# Patient Record
Sex: FEMALE | Race: WHITE | NOT HISPANIC OR LATINO | Employment: OTHER | URBAN - METROPOLITAN AREA
[De-identification: names, ages, dates, MRNs, and addresses within clinical notes are randomized per-mention and may not be internally consistent; named-entity substitution may affect disease eponyms.]

---

## 2023-11-07 ENCOUNTER — HOSPITAL ENCOUNTER (OUTPATIENT)
Dept: CARDIOLOGY | Facility: CLINIC | Age: 63
Discharge: HOME | End: 2023-11-07
Payer: COMMERCIAL

## 2023-11-07 DIAGNOSIS — I42.0 CONGESTIVE CARDIOMYOPATHY (MULTI): ICD-10-CM

## 2023-11-07 PROCEDURE — 93296 REM INTERROG EVL PM/IDS: CPT

## 2023-11-10 PROCEDURE — 93295 DEV INTERROG REMOTE 1/2/MLT: CPT | Performed by: INTERNAL MEDICINE

## 2023-12-11 ENCOUNTER — HOSPITAL ENCOUNTER (OUTPATIENT)
Dept: CARDIOLOGY | Facility: CLINIC | Age: 63
Discharge: HOME | End: 2023-12-11
Payer: COMMERCIAL

## 2023-12-11 DIAGNOSIS — I42.0 DILATED CARDIOMYOPATHY (MULTI): ICD-10-CM

## 2023-12-11 DIAGNOSIS — Z95.810 PRESENCE OF AUTOMATIC (IMPLANTABLE) CARDIAC DEFIBRILLATOR: ICD-10-CM

## 2024-01-15 ENCOUNTER — HOSPITAL ENCOUNTER (OUTPATIENT)
Dept: CARDIOLOGY | Facility: CLINIC | Age: 64
Discharge: HOME | End: 2024-01-15
Payer: COMMERCIAL

## 2024-01-15 DIAGNOSIS — I42.0 DILATED CARDIOMYOPATHY (MULTI): ICD-10-CM

## 2024-01-15 DIAGNOSIS — Z95.810 PRESENCE OF AUTOMATIC (IMPLANTABLE) CARDIAC DEFIBRILLATOR: ICD-10-CM

## 2024-02-01 ENCOUNTER — HOSPITAL ENCOUNTER (OUTPATIENT)
Dept: CARDIOLOGY | Facility: CLINIC | Age: 64
Discharge: HOME | End: 2024-02-01
Payer: COMMERCIAL

## 2024-02-01 DIAGNOSIS — I42.0 DILATED CARDIOMYOPATHY (MULTI): Primary | ICD-10-CM

## 2024-02-01 DIAGNOSIS — Z95.810 PRESENCE OF AUTOMATIC (IMPLANTABLE) CARDIAC DEFIBRILLATOR: ICD-10-CM

## 2024-02-01 DIAGNOSIS — I42.0 DILATED CARDIOMYOPATHY (MULTI): ICD-10-CM

## 2024-02-08 ENCOUNTER — DOCUMENTATION (OUTPATIENT)
Dept: CARDIOLOGY | Facility: HOSPITAL | Age: 64
End: 2024-02-08
Payer: COMMERCIAL

## 2024-02-12 ENCOUNTER — HOSPITAL ENCOUNTER (OUTPATIENT)
Dept: CARDIOLOGY | Facility: CLINIC | Age: 64
Discharge: HOME | End: 2024-02-12
Payer: COMMERCIAL

## 2024-02-12 DIAGNOSIS — I42.0 CONGESTIVE CARDIOMYOPATHY (MULTI): ICD-10-CM

## 2024-03-08 ENCOUNTER — HOSPITAL ENCOUNTER (OUTPATIENT)
Dept: CARDIOLOGY | Facility: CLINIC | Age: 64
Discharge: HOME | End: 2024-03-08
Payer: COMMERCIAL

## 2024-03-08 DIAGNOSIS — Z95.810 PRESENCE OF AUTOMATIC (IMPLANTABLE) CARDIAC DEFIBRILLATOR: ICD-10-CM

## 2024-03-08 DIAGNOSIS — I42.0 DILATED CARDIOMYOPATHY (MULTI): ICD-10-CM

## 2024-03-13 ENCOUNTER — OFFICE VISIT (OUTPATIENT)
Dept: CARDIOLOGY | Facility: CLINIC | Age: 64
End: 2024-03-13
Payer: COMMERCIAL

## 2024-03-13 VITALS
WEIGHT: 292 LBS | BODY MASS INDEX: 51.74 KG/M2 | SYSTOLIC BLOOD PRESSURE: 120 MMHG | DIASTOLIC BLOOD PRESSURE: 60 MMHG | HEART RATE: 96 BPM | HEIGHT: 63 IN

## 2024-03-13 DIAGNOSIS — Z45.02 BIVENTRICULAR IMPLANTABLE CARDIOVERTER-DEFIBRILLATOR (ICD) AT END OF DEVICE LIFE: ICD-10-CM

## 2024-03-13 DIAGNOSIS — R79.1 ABNORMAL COAGULATION PROFILE: ICD-10-CM

## 2024-03-13 DIAGNOSIS — I42.0 DILATED CARDIOMYOPATHY (MULTI): Primary | ICD-10-CM

## 2024-03-13 DIAGNOSIS — Z95.810 PRESENCE OF AUTOMATIC (IMPLANTABLE) CARDIAC DEFIBRILLATOR: ICD-10-CM

## 2024-03-13 DIAGNOSIS — Z78.9 NEVER SMOKED TOBACCO: ICD-10-CM

## 2024-03-13 DIAGNOSIS — I42.0 CONGESTIVE CARDIOMYOPATHY (MULTI): ICD-10-CM

## 2024-03-13 PROBLEM — I50.20 SYSTOLIC HEART FAILURE, ACC/AHA STAGE C (MULTI): Status: ACTIVE | Noted: 2024-03-13

## 2024-03-13 PROBLEM — I50.43 ACUTE ON CHRONIC COMBINED SYSTOLIC AND DIASTOLIC HEART FAILURE (MULTI): Status: ACTIVE | Noted: 2024-03-13

## 2024-03-13 PROBLEM — I35.0 AORTIC STENOSIS: Status: ACTIVE | Noted: 2024-03-13

## 2024-03-13 PROBLEM — K21.9 GASTROESOPHAGEAL REFLUX DISEASE: Status: ACTIVE | Noted: 2022-08-22

## 2024-03-13 PROBLEM — I50.814: Status: ACTIVE | Noted: 2024-03-13

## 2024-03-13 PROBLEM — R57.0 CARDIOGENIC SHOCK (MULTI): Status: ACTIVE | Noted: 2018-10-03

## 2024-03-13 PROBLEM — D64.9 ANEMIA: Status: ACTIVE | Noted: 2023-09-20

## 2024-03-13 PROBLEM — E78.5 HYPERLIPIDEMIA: Status: ACTIVE | Noted: 2023-03-01

## 2024-03-13 PROBLEM — I25.9 CHRONIC MYOCARDIAL ISCHEMIA: Status: ACTIVE | Noted: 2018-05-21

## 2024-03-13 PROBLEM — J96.11 CHRONIC RESPIRATORY FAILURE WITH HYPOXIA (MULTI): Status: ACTIVE | Noted: 2022-08-22

## 2024-03-13 PROBLEM — D50.9 IRON DEFICIENCY ANEMIA: Status: ACTIVE | Noted: 2023-12-13

## 2024-03-13 PROBLEM — I13.10: Status: ACTIVE | Noted: 2024-03-13

## 2024-03-13 PROBLEM — N17.9 AKI (ACUTE KIDNEY INJURY) (CMS-HCC): Status: ACTIVE | Noted: 2024-03-13

## 2024-03-13 PROBLEM — I25.10 CAD (CORONARY ARTERY DISEASE): Status: ACTIVE | Noted: 2024-03-13

## 2024-03-13 PROBLEM — I42.8 CARDIOMYOPATHY, NONISCHEMIC (MULTI): Status: ACTIVE | Noted: 2024-03-13

## 2024-03-13 PROBLEM — I10 ESSENTIAL HYPERTENSION: Status: ACTIVE | Noted: 2019-08-31

## 2024-03-13 PROBLEM — J44.9 COPD (CHRONIC OBSTRUCTIVE PULMONARY DISEASE) (MULTI): Status: ACTIVE | Noted: 2024-03-13

## 2024-03-13 PROBLEM — E11.22 CHRONIC KIDNEY DISEASE DUE TO TYPE 2 DIABETES MELLITUS (MULTI): Status: ACTIVE | Noted: 2022-08-22

## 2024-03-13 PROBLEM — Z98.890 S/P MITRAL VALVE CLIP IMPLANTATION: Status: ACTIVE | Noted: 2024-03-13

## 2024-03-13 PROBLEM — G47.33 OBSTRUCTIVE SLEEP APNEA ON CPAP: Status: ACTIVE | Noted: 2024-03-13

## 2024-03-13 PROBLEM — F32.0 MILD MAJOR DEPRESSION, SINGLE EPISODE (CMS-HCC): Status: ACTIVE | Noted: 2024-03-13

## 2024-03-13 PROBLEM — I05.9 MITRAL VALVE DISORDER: Status: ACTIVE | Noted: 2024-03-13

## 2024-03-13 PROBLEM — E66.01 MORBID OBESITY (MULTI): Status: ACTIVE | Noted: 2023-03-02

## 2024-03-13 PROBLEM — I44.7 LBBB (LEFT BUNDLE BRANCH BLOCK): Status: ACTIVE | Noted: 2023-08-07

## 2024-03-13 PROBLEM — E11.65 DIABETES MELLITUS WITH HYPERGLYCEMIA (MULTI): Status: ACTIVE | Noted: 2024-03-13

## 2024-03-13 PROBLEM — I46.9 PEA (PULSELESS ELECTRICAL ACTIVITY) (MULTI): Status: ACTIVE | Noted: 2024-03-13

## 2024-03-13 PROBLEM — Z95.818 S/P MITRAL VALVE CLIP IMPLANTATION: Status: ACTIVE | Noted: 2024-03-13

## 2024-03-13 PROBLEM — Z95.5 PRESENCE OF STENT IN CORONARY ARTERY: Status: ACTIVE | Noted: 2018-09-09

## 2024-03-13 PROCEDURE — 99215 OFFICE O/P EST HI 40 MIN: CPT | Performed by: INTERNAL MEDICINE

## 2024-03-13 PROCEDURE — 3078F DIAST BP <80 MM HG: CPT | Performed by: INTERNAL MEDICINE

## 2024-03-13 PROCEDURE — 3008F BODY MASS INDEX DOCD: CPT | Performed by: INTERNAL MEDICINE

## 2024-03-13 PROCEDURE — 1036F TOBACCO NON-USER: CPT | Performed by: INTERNAL MEDICINE

## 2024-03-13 PROCEDURE — 3074F SYST BP LT 130 MM HG: CPT | Performed by: INTERNAL MEDICINE

## 2024-03-13 RX ORDER — ALBUTEROL SULFATE 90 UG/1
2 AEROSOL, METERED RESPIRATORY (INHALATION) EVERY 6 HOURS
COMMUNITY
Start: 2017-10-19

## 2024-03-13 RX ORDER — METOPROLOL TARTRATE 100 MG/1
100 TABLET ORAL 2 TIMES DAILY
COMMUNITY

## 2024-03-13 RX ORDER — SODIUM CHLORIDE 9 MG/ML
100 INJECTION, SOLUTION INTRAVENOUS CONTINUOUS
Status: CANCELLED | OUTPATIENT
Start: 2024-03-13

## 2024-03-13 RX ORDER — ATORVASTATIN CALCIUM 40 MG/1
40 TABLET, FILM COATED ORAL
COMMUNITY
Start: 2019-07-24

## 2024-03-13 RX ORDER — FERROUS SULFATE 325(65) MG
65 TABLET ORAL
COMMUNITY
Start: 2019-12-06

## 2024-03-13 RX ORDER — FLUTICASONE PROPIONATE AND SALMETEROL 250; 50 UG/1; UG/1
1 POWDER RESPIRATORY (INHALATION) 2 TIMES DAILY
COMMUNITY
Start: 2024-02-26

## 2024-03-13 RX ORDER — ACETAMINOPHEN 500 MG
2000 TABLET ORAL EVERY 24 HOURS
COMMUNITY
Start: 2019-04-19

## 2024-03-13 RX ORDER — ACETAMINOPHEN 325 MG/1
325 TABLET ORAL EVERY 6 HOURS PRN
COMMUNITY
Start: 2020-01-16

## 2024-03-13 RX ORDER — INSULIN GLARGINE 100 [IU]/ML
56 INJECTION, SOLUTION SUBCUTANEOUS 2 TIMES DAILY
COMMUNITY

## 2024-03-13 RX ORDER — LINAGLIPTIN 5 MG/1
5 TABLET, FILM COATED ORAL
COMMUNITY
Start: 2019-08-19

## 2024-03-13 RX ORDER — TORSEMIDE 20 MG/1
20 TABLET ORAL DAILY
COMMUNITY
Start: 2018-09-28

## 2024-03-13 RX ORDER — ESOMEPRAZOLE MAGNESIUM 40 MG/1
40 CAPSULE, DELAYED RELEASE ORAL
COMMUNITY
Start: 2022-01-07

## 2024-03-13 RX ORDER — DIGOXIN 125 MCG
125 TABLET ORAL EVERY OTHER DAY
COMMUNITY
Start: 2017-10-19

## 2024-03-13 RX ORDER — NORTRIPTYLINE HYDROCHLORIDE 75 MG/1
150 CAPSULE ORAL
COMMUNITY
Start: 2019-08-01

## 2024-03-13 RX ORDER — GABAPENTIN 600 MG/1
1 TABLET ORAL 2 TIMES DAILY
COMMUNITY
Start: 2017-04-24

## 2024-03-13 NOTE — PROGRESS NOTES
CARDIOLOGY OFFICE VISIT      CHIEF COMPLAINT  Chief Complaint   Patient presents with    New Patient Visit     Pt states she wants a second opinion with her conditions        HISTORY OF PRESENT ILLNESS  HPI    63-year-old female with a past medical history of Jehovah witness, coronary artery disease status post PCI of the LAD in April 4, 2019.  Congestive heart failure mixed ischemic and nonischemic cardiomyopathy with moderate to severe left ventricular dysfunction with a value of 30 to 35%, left under branch block status post CRT D implanted in October 8, 2018 and severe mitral regurgitation, diabetes mellitus, obstructive sleep apnea, chronic kidney disease, morbid obesity, COPD.  Patient used to follow by heart failure service back in 2019.    Looking at her record she was evaluated in January 2020 for MitraClip.  After procedure was performed postprocedure she needed to be on pressors due to episodes of hypotension and also epistaxis.    Since the discharge from the hospital she lost completely follow-up with cardiology service.  She presented today for evaluation of biventricular ICD device that recently has been producing BPM.    Apparently device has been followed remotely since then from [facility] and the device showed back in March 8, 2024, that the battery reached RRT in February 1, 2024.  There was adequate sensing, capture and impedances of all 3 leads.  Pacing mode DDD  bpm.  Patient has A Medtronic Padma MRI Quad CRT-D.  No significant atrial or ventricular arrhythmia seen on device interrogation.    Current EKG shows atrial sensed ventricular paced rhythm at a rate of 96 bpm QRS duration 192 ms QT corrected 563 ms.  Rhythm strip shows the same pattern.    Lately she has been feeling well.  Denies any symptoms like chest pain or shortness of breath.        Echocardiogram 2020    CONCLUSIONS:   1. The left ventricular systolic function is severely decreased with a 25% estimated  ejection fraction.   2. Abnormal septal motion consistent with RV pacemaker.   3. Spectral Doppler shows a pseudonormal pattern of left ventricular diastolic filling.   4. There is eccentric left ventricular hypertrophy.   5. The left atrium is moderately dilated.   6. A mitral clip is present resulting in dual mitral orifices. There is moderate mitral stenosis by gradient (10 mmHg) mild by pressure half-time (2.1 cm2).   7. The left ventricular cavity size is moderate to severely dilated.   8. There is global hypokinesis of the left ventricle with minor regional variations.    Catheterization in 2018 with successful drug-eluting stent implantation with the mid LAD.    Past Medical History  No past medical history on file.    Social History  Social History     Tobacco Use    Smoking status: Never    Smokeless tobacco: Never   Substance Use Topics    Alcohol use: Never    Drug use: Never       Family History   No family history on file.     Allergies:  Allergies   Allergen Reactions    Pineapple Anaphylaxis and Hives    Hydrocodone-Acetaminophen GI Upset    Mushroom Hives    Oxycodone-Acetaminophen GI Upset    Penicillins Hives    Tramadol GI Upset        Outpatient Medications:  Current Outpatient Medications   Medication Instructions    acetaminophen (TYLENOL) 325 mg, oral, Every 6 hours PRN    albuterol (ProAir HFA) 90 mcg/actuation inhaler 2 puffs, inhalation, Every 6 hours    aspirin 81 mg capsule oral    atorvastatin (LIPITOR) 40 mg, oral    cholecalciferol (VITAMIN D-3) 2,000 Units, oral, Every 24 hours    digoxin (LANOXIN) 125 mg, oral, Every 24 hours, Every other day     esomeprazole (NEXIUM) 40 mg, oral, Daily before breakfast    ferrous sulfate (325 mg ferrous sulfate) 65 mg, oral    fluticasone propion-salmeteroL (Advair Diskus) 250-50 mcg/dose diskus inhaler 1 puff, inhalation, 2 times daily    gabapentin (Neurontin) 600 mg tablet 1 tablet, oral, 2 times daily    INSULIN ASPART U-100 SUBQ subcutaneous     Lantus Solostar U-100 Insulin 100 unit/mL (3 mL) pen INJECT 50 UNITS SUBCUTANEOUSLY TWICE DAILY    metoprolol tartrate (LOPRESSOR) 100 mg, oral, 2 times daily    nortriptyline (PAMELOR) 150 mg, oral    torsemide (Demadex) 20 mg tablet 1 tablet, oral, 2 times daily    Tradjenta 5 mg, oral          REVIEW OF SYSTEMS  Review of Systems   Constitutional:        Walking cane for assistance    All other systems reviewed and are negative.        VITALS  Vitals:    03/13/24 1712   BP: 120/60   Pulse: 96       PHYSICAL EXAM  Vitals reviewed.   Constitutional:       Appearance: Normal and healthy appearance. Well-developed and not in distress.   Eyes:      Conjunctiva/sclera: Conjunctivae normal.      Pupils: Pupils are equal, round, and reactive to light.   Neck:      Vascular: No JVR. JVD normal.   Pulmonary:      Effort: Pulmonary effort is normal.      Breath sounds: Normal breath sounds. No wheezing. No rhonchi. No rales.   Chest:      Chest wall: Not tender to palpatation.      Comments: Left sided device pocket- healed and well approximated. No lump or hematoma    Cardiovascular:      PMI at left midclavicular line. Normal rate. Regular rhythm. Normal S1. Normal S2.       Murmurs: There is no murmur.      No gallop.  No click. No rub.   Pulses:     Intact distal pulses.   Edema:     Peripheral edema absent.   Abdominal:      Tenderness: There is no abdominal tenderness.   Musculoskeletal: Normal range of motion.         General: No tenderness.      Cervical back: Normal range of motion. Skin:     General: Skin is warm and dry.   Neurological:      General: No focal deficit present.      Mental Status: Alert and oriented to person, place and time.   Psychiatric:         Behavior: Behavior is cooperative.           ASSESSMENT AND PLAN  Clinical impression    1.  Congestive heart failure new Heart Association class II  2.  Ischemic-nonischemic cardiomyopathy with a left ventricular action fraction of 30 to 35%.  Last  echocardiogram in 2020 shows left ventricular ejection fraction of 20%.  3.  Status post biventricular ICD system implanted back in 2019 now with battery at RRT.  4.  Coronary artery disease with history of percutaneous coronary interventions in the past  5.  Severe mitral regurgitation status post mitral clip in 2020  6.  Diabetes mellitus  7.  Stress sleep apnea  8.  Chronic kidney disease  9.  COPD    Plan recommendations    I had a lengthy discussion with patient and family member regarding device interrogations.  So far she has not had a personal device interrogation but [facility] has been receiving device interrogation and it shows that battery is at RRT since February 1, 2024.  She is not pacemaker dependent.  Patient will be scheduled for biventricular ICD by exchange in the next few days [facility].  Procedure, risk, benefits and possible complications were explained to patient.  All questions were answered.  Patient agrees with plan.  Informed consent was signed.    Also going to her heart failure medications, there is some difference between medications that she used to receive in the past.  We will set up an appointment with Dr. Franco from heart failure service for adjustment of medical therapy.    Follow my office in 7 days postprocedure for wound assessment and follow with device clinic as scheduled.    Risk factor modification and lifestyle modification discussed with patient. Diet , exercise and hydration discussed with patient.    I have personally review with patient during this office visit, laboratory data, echocardiogram results, stress test results, Holter-event monitor results prior and after the last electrophysiology visit. All questions has been answered.    Please excuse any errors in grammar or translation related to this dictation.  Voice recognition software was utilized to prepare this document.

## 2024-03-13 NOTE — PATIENT INSTRUCTIONS
Patient to follow up in 3 months with DONNA Garay     Office will schedule ICD Generator change for you soon with Dr. Robb Schuster MD    Please complete lab work prior to procedure- orders in system.   You can take all of your normal morning medications the day of the procedure.     Office will also schedule an Echo in near future.     Please schedule follow ups with Dr. Franco and Dr. Coleman soon    No other changes today.   Continue same medications and treatments.   Patient educated on proper medication use.   Patient educated on risk factor modification.   Please bring any lab results from other providers / physicians to your next appointment.     Please bring all medicines, vitamins, and herbal supplements with you when you come to the office.     Prescriptions will not be filled unless you are compliant with your follow up appointments or have a follow up appointment scheduled as per instruction of your physician. Refills should be requested at the time of your visit.    Juan Miguel SANTA RN am scribing for and in the presence of Dr. Robb Schuster MD

## 2024-03-13 NOTE — H&P (VIEW-ONLY)
CARDIOLOGY OFFICE VISIT      CHIEF COMPLAINT  Chief Complaint   Patient presents with    New Patient Visit     Pt states she wants a second opinion with her conditions        HISTORY OF PRESENT ILLNESS  HPI    63-year-old female with a past medical history of Jehovah witness, coronary artery disease status post PCI of the LAD in April 4, 2019.  Congestive heart failure mixed ischemic and nonischemic cardiomyopathy with moderate to severe left ventricular dysfunction with a value of 30 to 35%, left under branch block status post CRT D implanted in October 8, 2018 and severe mitral regurgitation, diabetes mellitus, obstructive sleep apnea, chronic kidney disease, morbid obesity, COPD.  Patient used to follow by heart failure service back in 2019.    Looking at her record she was evaluated in January 2020 for MitraClip.  After procedure was performed postprocedure she needed to be on pressors due to episodes of hypotension and also epistaxis.    Since the discharge from the hospital she lost completely follow-up with cardiology service.  She presented today for evaluation of biventricular ICD device that recently has been producing BPM.    Apparently device has been followed remotely since then from [facility] and the device showed back in March 8, 2024, that the battery reached RRT in February 1, 2024.  There was adequate sensing, capture and impedances of all 3 leads.  Pacing mode DDD  bpm.  Patient has A Medtronic Padma MRI Quad CRT-D.  No significant atrial or ventricular arrhythmia seen on device interrogation.    Current EKG shows atrial sensed ventricular paced rhythm at a rate of 96 bpm QRS duration 192 ms QT corrected 563 ms.  Rhythm strip shows the same pattern.    Lately she has been feeling well.  Denies any symptoms like chest pain or shortness of breath.        Echocardiogram 2020    CONCLUSIONS:   1. The left ventricular systolic function is severely decreased with a 25% estimated  ejection fraction.   2. Abnormal septal motion consistent with RV pacemaker.   3. Spectral Doppler shows a pseudonormal pattern of left ventricular diastolic filling.   4. There is eccentric left ventricular hypertrophy.   5. The left atrium is moderately dilated.   6. A mitral clip is present resulting in dual mitral orifices. There is moderate mitral stenosis by gradient (10 mmHg) mild by pressure half-time (2.1 cm2).   7. The left ventricular cavity size is moderate to severely dilated.   8. There is global hypokinesis of the left ventricle with minor regional variations.    Catheterization in 2018 with successful drug-eluting stent implantation with the mid LAD.    Past Medical History  No past medical history on file.    Social History  Social History     Tobacco Use    Smoking status: Never    Smokeless tobacco: Never   Substance Use Topics    Alcohol use: Never    Drug use: Never       Family History   No family history on file.     Allergies:  Allergies   Allergen Reactions    Pineapple Anaphylaxis and Hives    Hydrocodone-Acetaminophen GI Upset    Mushroom Hives    Oxycodone-Acetaminophen GI Upset    Penicillins Hives    Tramadol GI Upset        Outpatient Medications:  Current Outpatient Medications   Medication Instructions    acetaminophen (TYLENOL) 325 mg, oral, Every 6 hours PRN    albuterol (ProAir HFA) 90 mcg/actuation inhaler 2 puffs, inhalation, Every 6 hours    aspirin 81 mg capsule oral    atorvastatin (LIPITOR) 40 mg, oral    cholecalciferol (VITAMIN D-3) 2,000 Units, oral, Every 24 hours    digoxin (LANOXIN) 125 mg, oral, Every 24 hours, Every other day     esomeprazole (NEXIUM) 40 mg, oral, Daily before breakfast    ferrous sulfate (325 mg ferrous sulfate) 65 mg, oral    fluticasone propion-salmeteroL (Advair Diskus) 250-50 mcg/dose diskus inhaler 1 puff, inhalation, 2 times daily    gabapentin (Neurontin) 600 mg tablet 1 tablet, oral, 2 times daily    INSULIN ASPART U-100 SUBQ subcutaneous     Lantus Solostar U-100 Insulin 100 unit/mL (3 mL) pen INJECT 50 UNITS SUBCUTANEOUSLY TWICE DAILY    metoprolol tartrate (LOPRESSOR) 100 mg, oral, 2 times daily    nortriptyline (PAMELOR) 150 mg, oral    torsemide (Demadex) 20 mg tablet 1 tablet, oral, 2 times daily    Tradjenta 5 mg, oral          REVIEW OF SYSTEMS  Review of Systems   Constitutional:        Walking cane for assistance    All other systems reviewed and are negative.        VITALS  Vitals:    03/13/24 1712   BP: 120/60   Pulse: 96       PHYSICAL EXAM  Vitals reviewed.   Constitutional:       Appearance: Normal and healthy appearance. Well-developed and not in distress.   Eyes:      Conjunctiva/sclera: Conjunctivae normal.      Pupils: Pupils are equal, round, and reactive to light.   Neck:      Vascular: No JVR. JVD normal.   Pulmonary:      Effort: Pulmonary effort is normal.      Breath sounds: Normal breath sounds. No wheezing. No rhonchi. No rales.   Chest:      Chest wall: Not tender to palpatation.      Comments: Left sided device pocket- healed and well approximated. No lump or hematoma    Cardiovascular:      PMI at left midclavicular line. Normal rate. Regular rhythm. Normal S1. Normal S2.       Murmurs: There is no murmur.      No gallop.  No click. No rub.   Pulses:     Intact distal pulses.   Edema:     Peripheral edema absent.   Abdominal:      Tenderness: There is no abdominal tenderness.   Musculoskeletal: Normal range of motion.         General: No tenderness.      Cervical back: Normal range of motion. Skin:     General: Skin is warm and dry.   Neurological:      General: No focal deficit present.      Mental Status: Alert and oriented to person, place and time.   Psychiatric:         Behavior: Behavior is cooperative.           ASSESSMENT AND PLAN  Clinical impression    1.  Congestive heart failure new Heart Association class II  2.  Ischemic-nonischemic cardiomyopathy with a left ventricular action fraction of 30 to 35%.  Last  echocardiogram in 2020 shows left ventricular ejection fraction of 20%.  3.  Status post biventricular ICD system implanted back in 2019 now with battery at RRT.  4.  Coronary artery disease with history of percutaneous coronary interventions in the past  5.  Severe mitral regurgitation status post mitral clip in 2020  6.  Diabetes mellitus  7.  Stress sleep apnea  8.  Chronic kidney disease  9.  COPD    Plan recommendations    I had a lengthy discussion with patient and family member regarding device interrogations.  So far she has not had a personal device interrogation but [facility] has been receiving device interrogation and it shows that battery is at RRT since February 1, 2024.  She is not pacemaker dependent.  Patient will be scheduled for biventricular ICD by exchange in the next few days [facility].  Procedure, risk, benefits and possible complications were explained to patient.  All questions were answered.  Patient agrees with plan.  Informed consent was signed.    Also going to her heart failure medications, there is some difference between medications that she used to receive in the past.  We will set up an appointment with Dr. Franco from heart failure service for adjustment of medical therapy.    Follow my office in 7 days postprocedure for wound assessment and follow with device clinic as scheduled.    Risk factor modification and lifestyle modification discussed with patient. Diet , exercise and hydration discussed with patient.    I have personally review with patient during this office visit, laboratory data, echocardiogram results, stress test results, Holter-event monitor results prior and after the last electrophysiology visit. All questions has been answered.    Please excuse any errors in grammar or translation related to this dictation.  Voice recognition software was utilized to prepare this document.

## 2024-03-14 ENCOUNTER — TELEPHONE (OUTPATIENT)
Dept: CARDIOLOGY | Facility: CLINIC | Age: 64
End: 2024-03-14
Payer: COMMERCIAL

## 2024-03-14 DIAGNOSIS — I42.8 CARDIOMYOPATHY, NONISCHEMIC (MULTI): ICD-10-CM

## 2024-03-14 DIAGNOSIS — I50.43 ACUTE ON CHRONIC COMBINED SYSTOLIC AND DIASTOLIC HEART FAILURE (MULTI): Primary | ICD-10-CM

## 2024-03-14 PROBLEM — Z95.810 PRESENCE OF AUTOMATIC (IMPLANTABLE) CARDIAC DEFIBRILLATOR: Status: ACTIVE | Noted: 2024-03-13

## 2024-03-20 LAB
NON-UH HIE ANION GAP:SCNC:PT:SER/PLAS:QN:: 13 MEQ/L (ref 6–16)
NON-UH HIE CALCIUM:MCNC:PT:SER/PLAS:QN:: 8.7 MG/DL (ref 8.9–11.1)
NON-UH HIE CARBON DIOXIDE:SCNC:PT:SER/PLAS:QN:: 31 MMOL/L (ref 21–31)
NON-UH HIE CHLORIDE:SCNC:PT:SER/PLAS:QN:: 102 MMOL/L (ref 101–111)
NON-UH HIE COAGULATION SURFACE INDUCED:TIME:PT:PPP:QN:COAG: 41.9 SECOND(S) (ref 25.1–36.5)
NON-UH HIE COAGULATION TISSUE FACTOR INDUCED.INR:RELTIME:PT:PPP:QN:COAG: 1.04
NON-UH HIE COAGULATION TISSUE FACTOR INDUCED:TIME:PT:PPP:QN:COAG: 11.7 SECOND(S) (ref 9.4–12.5)
NON-UH HIE CREATININE:MCNC:PT:SER/PLAS:QN:: 1.9 MG/DL (ref 0.5–1.3)
NON-UH HIE EGFR: 30 ML/MIN/1.73 M2
NON-UH HIE ERYTHROCYTE DISTRIBUTION WIDTH:RATIO:PT:RBC:QN:AUTOMATED COUNT: 18.3 % (ref 10.9–14.2)
NON-UH HIE ERYTHROCYTE MEAN CORPUSCULAR HEMOGLOBIN CONCENTRATION:MCNC:PT:RB: 32.7 GM/DL (ref 31.4–36)
NON-UH HIE ERYTHROCYTE MEAN CORPUSCULAR HEMOGLOBIN:ENTMASS:PT:RBC:QN:AUTOMA: 28.8 PG (ref 27–34)
NON-UH HIE ERYTHROCYTE MEAN CORPUSCULAR VOLUME:ENTVOL:PT:RBC:QN:AUTOMATED C: 88.1 FL (ref 80–100)
NON-UH HIE ERYTHROCYTE SIZE:MORPH:PT:BLD:NOM:: NORMAL
NON-UH HIE ERYTHROCYTES:NCNC:PT:BLD:QN:AUTOMATED COUNT: 3.9 E12/L (ref 4.3–5.9)
NON-UH HIE GLUCOSE:MCNC:PT:SER/PLAS:QN:: 171 MG/DL (ref 55–199)
NON-UH HIE HEMATOCRIT:VFR:PT:BLD:QN:AUTOMATED COUNT: 34.6 % (ref 34–46)
NON-UH HIE HEMOGLOBIN:MCNC:PT:BLD:QN:: 11.3 GM/DL (ref 12–16)
NON-UH HIE LEUKOCYTES: 8.5 E9/L (ref 4–11)
NON-UH HIE PLATELET MEAN VOLUME:ENTVOL:PT:BLD:QN:AUTOMATED COUNT: 7.7 FL (ref 6.4–10.8)
NON-UH HIE PLATELET: 240 E9/L (ref 150–500)
NON-UH HIE POTASSIUM:SCNC:PT:SER/PLAS:QN:: 3.7 MMOL/L (ref 3.5–5.3)
NON-UH HIE SODIUM:SCNC:PT:SER/PLAS:QN:: 142 MMOL/L (ref 135–145)
NON-UH HIE UREA NITROGEN/CREATININE:MRTO:PT:SER/PLAS:QN:: 22 NO UNITS (ref 10–20)
NON-UH HIE UREA NITROGEN:MCNC:PT:SER/PLAS:QN:: 42 MG/DL (ref 5–21)

## 2024-03-28 ENCOUNTER — HOSPITAL ENCOUNTER (OUTPATIENT)
Facility: HOSPITAL | Age: 64
Setting detail: OUTPATIENT SURGERY
Discharge: HOME | End: 2024-03-28
Attending: INTERNAL MEDICINE | Admitting: INTERNAL MEDICINE
Payer: COMMERCIAL

## 2024-03-28 ENCOUNTER — ANESTHESIA (OUTPATIENT)
Dept: CARDIOLOGY | Facility: HOSPITAL | Age: 64
End: 2024-03-28

## 2024-03-28 ENCOUNTER — APPOINTMENT (OUTPATIENT)
Dept: CARDIOLOGY | Facility: HOSPITAL | Age: 64
End: 2024-03-28
Payer: COMMERCIAL

## 2024-03-28 ENCOUNTER — ANESTHESIA EVENT (OUTPATIENT)
Dept: CARDIOLOGY | Facility: HOSPITAL | Age: 64
End: 2024-03-28

## 2024-03-28 VITALS
RESPIRATION RATE: 20 BRPM | BODY MASS INDEX: 51.91 KG/M2 | TEMPERATURE: 96.8 F | DIASTOLIC BLOOD PRESSURE: 66 MMHG | SYSTOLIC BLOOD PRESSURE: 143 MMHG | HEART RATE: 82 BPM | HEIGHT: 63 IN | OXYGEN SATURATION: 98 % | WEIGHT: 293 LBS

## 2024-03-28 DIAGNOSIS — Z95.810 PRESENCE OF AUTOMATIC (IMPLANTABLE) CARDIAC DEFIBRILLATOR: Primary | ICD-10-CM

## 2024-03-28 LAB
ANION GAP SERPL CALC-SCNC: 12 MMOL/L (ref 10–20)
APTT PPP: 39 SECONDS (ref 27–38)
BUN SERPL-MCNC: 42 MG/DL (ref 6–23)
CALCIUM SERPL-MCNC: 8.7 MG/DL (ref 8.6–10.3)
CHLORIDE SERPL-SCNC: 105 MMOL/L (ref 98–107)
CO2 SERPL-SCNC: 26 MMOL/L (ref 21–32)
CREAT SERPL-MCNC: 1.81 MG/DL (ref 0.5–1.05)
EGFRCR SERPLBLD CKD-EPI 2021: 32 ML/MIN/1.73M*2
ERYTHROCYTE [DISTWIDTH] IN BLOOD BY AUTOMATED COUNT: 17.9 % (ref 11.5–14.5)
GLUCOSE BLD MANUAL STRIP-MCNC: 105 MG/DL (ref 74–99)
GLUCOSE BLD MANUAL STRIP-MCNC: 48 MG/DL (ref 74–99)
GLUCOSE BLD MANUAL STRIP-MCNC: 59 MG/DL (ref 74–99)
GLUCOSE BLD MANUAL STRIP-MCNC: 71 MG/DL (ref 74–99)
GLUCOSE BLD MANUAL STRIP-MCNC: 82 MG/DL (ref 74–99)
GLUCOSE SERPL-MCNC: 69 MG/DL (ref 74–99)
HCT VFR BLD AUTO: 36.6 % (ref 36–46)
HGB BLD-MCNC: 11.8 G/DL (ref 12–16)
INR PPP: 1.2 (ref 0.9–1.1)
MCH RBC QN AUTO: 29.1 PG (ref 26–34)
MCHC RBC AUTO-ENTMCNC: 32.2 G/DL (ref 32–36)
MCV RBC AUTO: 90 FL (ref 80–100)
NRBC BLD-RTO: 0 /100 WBCS (ref 0–0)
PLATELET # BLD AUTO: 212 X10*3/UL (ref 150–450)
POTASSIUM SERPL-SCNC: 3.4 MMOL/L (ref 3.5–5.3)
PROTHROMBIN TIME: 14 SECONDS (ref 9.8–12.8)
RBC # BLD AUTO: 4.06 X10*6/UL (ref 4–5.2)
SODIUM SERPL-SCNC: 140 MMOL/L (ref 136–145)
WBC # BLD AUTO: 7.3 X10*3/UL (ref 4.4–11.3)

## 2024-03-28 PROCEDURE — 85027 COMPLETE CBC AUTOMATED: CPT | Performed by: NURSE PRACTITIONER

## 2024-03-28 PROCEDURE — 2500000005 HC RX 250 GENERAL PHARMACY W/O HCPCS: Performed by: NURSE PRACTITIONER

## 2024-03-28 PROCEDURE — 99153 MOD SED SAME PHYS/QHP EA: CPT | Performed by: INTERNAL MEDICINE

## 2024-03-28 PROCEDURE — 93287 PERI-PX DEVICE EVAL & PRGR: CPT | Performed by: INTERNAL MEDICINE

## 2024-03-28 PROCEDURE — 7100000009 HC PHASE TWO TIME - INITIAL BASE CHARGE: Performed by: INTERNAL MEDICINE

## 2024-03-28 PROCEDURE — 7100000010 HC PHASE TWO TIME - EACH INCREMENTAL 1 MINUTE: Performed by: INTERNAL MEDICINE

## 2024-03-28 PROCEDURE — 33264 RMVL & RPLCMT DFB GEN MLT LD: CPT | Performed by: INTERNAL MEDICINE

## 2024-03-28 PROCEDURE — 82947 ASSAY GLUCOSE BLOOD QUANT: CPT | Mod: 59

## 2024-03-28 PROCEDURE — 99152 MOD SED SAME PHYS/QHP 5/>YRS: CPT | Performed by: INTERNAL MEDICINE

## 2024-03-28 PROCEDURE — 2500000004 HC RX 250 GENERAL PHARMACY W/ HCPCS (ALT 636 FOR OP/ED): Performed by: NURSE PRACTITIONER

## 2024-03-28 PROCEDURE — 2500000001 HC RX 250 WO HCPCS SELF ADMINISTERED DRUGS (ALT 637 FOR MEDICARE OP): Performed by: NURSE PRACTITIONER

## 2024-03-28 PROCEDURE — 2750000001 HC OR 275 NO HCPCS: Performed by: INTERNAL MEDICINE

## 2024-03-28 PROCEDURE — 2500000004 HC RX 250 GENERAL PHARMACY W/ HCPCS (ALT 636 FOR OP/ED): Performed by: INTERNAL MEDICINE

## 2024-03-28 PROCEDURE — 36415 COLL VENOUS BLD VENIPUNCTURE: CPT | Performed by: NURSE PRACTITIONER

## 2024-03-28 PROCEDURE — 2720000007 HC OR 272 NO HCPCS: Performed by: INTERNAL MEDICINE

## 2024-03-28 PROCEDURE — 85610 PROTHROMBIN TIME: CPT | Performed by: NURSE PRACTITIONER

## 2024-03-28 PROCEDURE — 2500000005 HC RX 250 GENERAL PHARMACY W/O HCPCS: Performed by: INTERNAL MEDICINE

## 2024-03-28 PROCEDURE — C1882 AICD, OTHER THAN SING/DUAL: HCPCS | Performed by: INTERNAL MEDICINE

## 2024-03-28 PROCEDURE — 93010 ELECTROCARDIOGRAM REPORT: CPT | Performed by: INTERNAL MEDICINE

## 2024-03-28 PROCEDURE — 93641 EP EVL 1/2CHMB PAC CVDFB TST: CPT | Performed by: INTERNAL MEDICINE

## 2024-03-28 PROCEDURE — 93005 ELECTROCARDIOGRAM TRACING: CPT

## 2024-03-28 PROCEDURE — 80048 BASIC METABOLIC PNL TOTAL CA: CPT | Performed by: NURSE PRACTITIONER

## 2024-03-28 PROCEDURE — 2500000002 HC RX 250 W HCPCS SELF ADMINISTERED DRUGS (ALT 637 FOR MEDICARE OP, ALT 636 FOR OP/ED): Performed by: NURSE PRACTITIONER

## 2024-03-28 DEVICE — CRTD DTPA2QQ COBALT XT HF QUAD MRI DF4
Type: IMPLANTABLE DEVICE | Site: CHEST | Status: FUNCTIONAL
Brand: COBALT™ XT HF QUAD CRT-D MRI SURESCAN™

## 2024-03-28 RX ORDER — SODIUM CHLORIDE 9 MG/ML
20 INJECTION, SOLUTION INTRAVENOUS CONTINUOUS
Status: DISCONTINUED | OUTPATIENT
Start: 2024-03-28 | End: 2024-03-28

## 2024-03-28 RX ORDER — POTASSIUM CHLORIDE 20 MEQ/1
40 TABLET, EXTENDED RELEASE ORAL ONCE
Status: COMPLETED | OUTPATIENT
Start: 2024-03-28 | End: 2024-03-28

## 2024-03-28 RX ORDER — LIDOCAINE HYDROCHLORIDE 10 MG/ML
INJECTION, SOLUTION EPIDURAL; INFILTRATION; INTRACAUDAL; PERINEURAL AS NEEDED
Status: DISCONTINUED | OUTPATIENT
Start: 2024-03-28 | End: 2024-03-28 | Stop reason: HOSPADM

## 2024-03-28 RX ORDER — FENTANYL CITRATE 50 UG/ML
INJECTION, SOLUTION INTRAMUSCULAR; INTRAVENOUS AS NEEDED
Status: DISCONTINUED | OUTPATIENT
Start: 2024-03-28 | End: 2024-03-28 | Stop reason: HOSPADM

## 2024-03-28 RX ORDER — ONDANSETRON HYDROCHLORIDE 2 MG/ML
4 INJECTION, SOLUTION INTRAVENOUS EVERY 8 HOURS PRN
Status: DISCONTINUED | OUTPATIENT
Start: 2024-03-28 | End: 2024-03-28 | Stop reason: HOSPADM

## 2024-03-28 RX ORDER — ACETAMINOPHEN 325 MG/1
650 TABLET ORAL EVERY 4 HOURS PRN
Status: DISCONTINUED | OUTPATIENT
Start: 2024-03-28 | End: 2024-03-28 | Stop reason: HOSPADM

## 2024-03-28 RX ORDER — VANCOMYCIN HYDROCHLORIDE 1 G/20ML
INJECTION, POWDER, LYOPHILIZED, FOR SOLUTION INTRAVENOUS AS NEEDED
Status: DISCONTINUED | OUTPATIENT
Start: 2024-03-28 | End: 2024-03-28 | Stop reason: HOSPADM

## 2024-03-28 RX ORDER — CHLORHEXIDINE GLUCONATE 40 MG/ML
SOLUTION TOPICAL ONCE
Status: COMPLETED | OUTPATIENT
Start: 2024-03-28 | End: 2024-03-28

## 2024-03-28 RX ORDER — DEXTROSE 50 % IN WATER (D50W) INTRAVENOUS SYRINGE
12.5 ONCE AS NEEDED
Status: COMPLETED | OUTPATIENT
Start: 2024-03-28 | End: 2024-03-28

## 2024-03-28 RX ORDER — MUPIROCIN 20 MG/G
1 OINTMENT TOPICAL ONCE
Status: COMPLETED | OUTPATIENT
Start: 2024-03-28 | End: 2024-03-28

## 2024-03-28 RX ORDER — VANCOMYCIN HYDROCHLORIDE 1 G/200ML
1000 INJECTION, SOLUTION INTRAVENOUS ONCE
Status: COMPLETED | OUTPATIENT
Start: 2024-03-28 | End: 2024-03-28

## 2024-03-28 RX ORDER — MIDAZOLAM HYDROCHLORIDE 1 MG/ML
INJECTION, SOLUTION INTRAMUSCULAR; INTRAVENOUS AS NEEDED
Status: DISCONTINUED | OUTPATIENT
Start: 2024-03-28 | End: 2024-03-28 | Stop reason: HOSPADM

## 2024-03-28 RX ORDER — DEXTROSE MONOHYDRATE AND SODIUM CHLORIDE 5; .45 G/100ML; G/100ML
50 INJECTION, SOLUTION INTRAVENOUS CONTINUOUS
Status: DISCONTINUED | OUTPATIENT
Start: 2024-03-28 | End: 2024-03-28 | Stop reason: HOSPADM

## 2024-03-28 RX ADMIN — DEXTROSE AND SODIUM CHLORIDE 50 ML/HR: 5; 450 INJECTION, SOLUTION INTRAVENOUS at 11:49

## 2024-03-28 RX ADMIN — Medication: at 11:22

## 2024-03-28 RX ADMIN — MUPIROCIN 1 APPLICATION: 20 OINTMENT TOPICAL at 11:22

## 2024-03-28 RX ADMIN — SODIUM CHLORIDE 20 ML/HR: 9 INJECTION, SOLUTION INTRAVENOUS at 11:22

## 2024-03-28 RX ADMIN — DEXTROSE MONOHYDRATE 12.5 G: 25 INJECTION, SOLUTION INTRAVENOUS at 12:33

## 2024-03-28 RX ADMIN — POTASSIUM CHLORIDE 40 MEQ: 1500 TABLET, EXTENDED RELEASE ORAL at 15:49

## 2024-03-28 SDOH — HEALTH STABILITY: MENTAL HEALTH: CURRENT SMOKER: 0

## 2024-03-28 ASSESSMENT — PAIN SCALES - GENERAL
PAINLEVEL_OUTOF10: 0 - NO PAIN

## 2024-03-28 ASSESSMENT — COLUMBIA-SUICIDE SEVERITY RATING SCALE - C-SSRS
6. HAVE YOU EVER DONE ANYTHING, STARTED TO DO ANYTHING, OR PREPARED TO DO ANYTHING TO END YOUR LIFE?: NO
1. IN THE PAST MONTH, HAVE YOU WISHED YOU WERE DEAD OR WISHED YOU COULD GO TO SLEEP AND NOT WAKE UP?: NO
2. HAVE YOU ACTUALLY HAD ANY THOUGHTS OF KILLING YOURSELF?: NO

## 2024-03-28 ASSESSMENT — PAIN - FUNCTIONAL ASSESSMENT
PAIN_FUNCTIONAL_ASSESSMENT: 0-10

## 2024-03-28 NOTE — NURSING NOTE
Patient returned from EP lab s/p ICD gen change. Right upper chest mepilex CDI, ice pack applied. Patient has no c/o at this time. Patient taking nourishment with daughter at bedside. Will continue to monitor.

## 2024-03-28 NOTE — NURSING NOTE
Discharge instructions reviewed with patient and daughter. Discussed restrictions post procedure and device monitoring. Questions and concerns addressed. Plan to dc home.

## 2024-03-28 NOTE — DISCHARGE INSTRUCTIONS
Home going instructions after a Pacemaker/ Defibrillator generator change    After a procedure using sedation  You should return home and rest for the remainder of the day and evening.   It is recommended a responsible adult be with you for the first 24 hours after the procedure.  Do not make any legal decisions for 24 hours after your procedure.  Do not drink alcoholic beverages for 24 hours after your procedure.    Wound care  Leave the surgical dressing in place for 7 days post implant  The dressing will be removed at the 1 week follow up appointment.    The dressing is water resistant.    You may shower and avoid water directly hitting the dressing.     Inspect your incisional site/ dressing each day  It is normal for the area around the incision to be tender for a few weeks following surgery.    Apply ice to the site 3-4 times per day in 20 minute intervals for at least 2 days after surgery.    Pain relievers such as Tylenol or Motrin are usually sufficient for pain relief.      Activity  Avoid driving for 24 hours  If you have had passing out spells or previously restricted from driving, discuss driving restrictions with your doctor.    Report to your physician    Increased redness, swelling, drainage or gaping of your incisional site  Increased pain at site unrelieved by pain medication  Fever or chills prior to the 1 week appointment  Bright red bleeding from the incisional site or complete saturation of the dressing  Dizziness, lightheadedness, or passing out    Remote monitoring/ Device ID card  You have been instructed by the device company representative regarding remote home monitoring.   There are multiple types of home monitoring units, please follow the instructions given  If you have questions please contact the device clinic for further instruction  After implant you will receive a temporary card.    Permanent card will come in the mail in the next few weeks.  It is important that you carry  your ID card with you at all times.      Follow up appointments  Incision (wound) check in 1 week  Appointment for device check and provider follow up in 6 months  These appointments will be scheduled and appear on your after visit summary

## 2024-03-28 NOTE — ANESTHESIA PREPROCEDURE EVALUATION
Patient: Ankita Wynne    Procedure Information       Date/Time: 03/28/24 1230    Procedure: ICD - Dual Generator Change Out (Left: Chest)    Location: [facility]    Providers: Robb Schuster MD            Relevant Problems   Cardiac   (+) Aortic stenosis   (+) CAD (coronary artery disease)   (+) Essential hypertension   (+) Hyperlipidemia   (+) LBBB (left bundle branch block)   (+) Mitral valve disorder   (+) PEA (Pulseless electrical activity) (CMS/[facility])   (+) Presence of automatic (implantable) cardiac defibrillator   (+) Presence of stent in coronary artery   (+) Right-sided congestive heart failure secondary to left-sided congestive heart failure (CMS/HCC)      Pulmonary   (+) COPD (chronic obstructive pulmonary disease) (CMS/[facility])   (+) Obstructive sleep apnea on CPAP      Neuro   (+) Mild major depression, single episode (CMS/HCC)      GI   (+) Gastroesophageal reflux disease      Endocrine   (+) 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 : N  Date of Last Liquid: 03/28/24  Time of Last Liquid: 0830  Date of Last Solid: 03/27/24  Time of Last Solid: 1930  Last Intake Type: Clear fluids  Time of Last Void: 0700         Physical Exam    Airway  Mallampati: II  TM distance: >3 FB  Neck ROM: full     Cardiovascular    Dental - normal exam     Pulmonary    Abdominal        Anesthesia Plan    History of general anesthesia?: yes  History of complications of general anesthesia?: no    ASA 3     MAC     The patient is not a current smoker.    Anesthetic plan and risks discussed with patient.    Plan discussed with CRNA.

## 2024-03-29 LAB
ATRIAL RATE: 81 BPM
P AXIS: 66 DEGREES
P OFFSET: 186 MS
P ONSET: 122 MS
PR INTERVAL: 132 MS
Q ONSET: 188 MS
QRS COUNT: 13 BEATS
QRS DURATION: 186 MS
QT INTERVAL: 458 MS
QTC CALCULATION(BAZETT): 532 MS
QTC FREDERICIA: 506 MS
R AXIS: 135 DEGREES
T AXIS: -26 DEGREES
T OFFSET: 417 MS
VENTRICULAR RATE: 81 BPM

## 2024-03-29 NOTE — SIGNIFICANT EVENT
Patient states that incision looks well and she is feeling good.  She has a followup appt scheduled and would like to recognize the surgical team  in the EP lab for their ultimate care.

## 2024-04-04 ENCOUNTER — CLINICAL SUPPORT (OUTPATIENT)
Dept: CARDIOLOGY | Facility: CLINIC | Age: 64
End: 2024-04-04
Payer: COMMERCIAL

## 2024-04-04 DIAGNOSIS — I50.9 CONGESTIVE HEART FAILURE, UNSPECIFIED HF CHRONICITY, UNSPECIFIED HEART FAILURE TYPE (MULTI): ICD-10-CM

## 2024-04-04 NOTE — PROGRESS NOTES
Pt. here for wound check of pacemaker replacement by Dr. Rodriguez on 3/28/24 at [facility]. Left clavicular area is well approximated with no erythema or drainage. Pt. denies any fever or chills. Temp 97.9

## 2024-04-12 ENCOUNTER — APPOINTMENT (OUTPATIENT)
Dept: CARDIOLOGY | Facility: CLINIC | Age: 64
End: 2024-04-12
Payer: COMMERCIAL

## 2024-06-17 ENCOUNTER — APPOINTMENT (OUTPATIENT)
Dept: CARDIOLOGY | Facility: HOSPITAL | Age: 64
End: 2024-06-17
Payer: COMMERCIAL

## 2024-06-17 ENCOUNTER — APPOINTMENT (OUTPATIENT)
Dept: CARDIOLOGY | Facility: CLINIC | Age: 64
End: 2024-06-17
Payer: COMMERCIAL

## 2024-08-19 ENCOUNTER — HOSPITAL ENCOUNTER (OUTPATIENT)
Dept: CARDIOLOGY | Facility: HOSPITAL | Age: 64
Discharge: HOME | End: 2024-08-19
Payer: COMMERCIAL

## 2024-08-19 DIAGNOSIS — Z95.810 PRESENCE OF AUTOMATIC CARDIOVERTER/DEFIBRILLATOR (AICD): Primary | ICD-10-CM

## 2024-08-19 DIAGNOSIS — I42.0 DILATED CARDIOMYOPATHY (MULTI): ICD-10-CM

## 2024-08-19 DIAGNOSIS — Z95.810 PRESENCE OF AUTOMATIC CARDIOVERTER/DEFIBRILLATOR (AICD): ICD-10-CM

## 2024-08-19 PROCEDURE — 93295 DEV INTERROG REMOTE 1/2/MLT: CPT | Performed by: INTERNAL MEDICINE

## 2024-08-19 PROCEDURE — 93296 REM INTERROG EVL PM/IDS: CPT

## 2024-11-21 ENCOUNTER — HOSPITAL ENCOUNTER (OUTPATIENT)
Dept: CARDIOLOGY | Facility: HOSPITAL | Age: 64
Discharge: HOME | End: 2024-11-21
Payer: COMMERCIAL

## 2024-11-21 DIAGNOSIS — Z95.810 PRESENCE OF AUTOMATIC CARDIOVERTER/DEFIBRILLATOR (AICD): ICD-10-CM

## 2024-11-21 DIAGNOSIS — I42.0 DILATED CARDIOMYOPATHY (MULTI): ICD-10-CM

## 2024-11-21 PROCEDURE — 93296 REM INTERROG EVL PM/IDS: CPT

## 2025-02-27 ENCOUNTER — HOSPITAL ENCOUNTER (OUTPATIENT)
Dept: CARDIOLOGY | Facility: HOSPITAL | Age: 65
Discharge: HOME | End: 2025-02-27
Payer: COMMERCIAL

## 2025-02-27 DIAGNOSIS — I42.0 DILATED CARDIOMYOPATHY (MULTI): ICD-10-CM

## 2025-02-27 DIAGNOSIS — Z95.810 PRESENCE OF AUTOMATIC CARDIOVERTER/DEFIBRILLATOR (AICD): ICD-10-CM

## 2025-02-27 PROCEDURE — 93295 DEV INTERROG REMOTE 1/2/MLT: CPT | Performed by: INTERNAL MEDICINE

## 2025-02-27 PROCEDURE — 93296 REM INTERROG EVL PM/IDS: CPT

## 2025-06-05 ENCOUNTER — HOSPITAL ENCOUNTER (OUTPATIENT)
Dept: CARDIOLOGY | Facility: HOSPITAL | Age: 65
Discharge: HOME | End: 2025-06-05
Payer: COMMERCIAL

## 2025-06-05 DIAGNOSIS — Z95.810 PRESENCE OF AUTOMATIC CARDIOVERTER/DEFIBRILLATOR (AICD): ICD-10-CM

## 2025-06-05 DIAGNOSIS — I42.0 DILATED CARDIOMYOPATHY (MULTI): ICD-10-CM

## 2025-06-05 PROCEDURE — 93296 REM INTERROG EVL PM/IDS: CPT

## (undated) DEVICE — DRESSING, MEPILEX BORDER, POST-OP AG, 4 X 6 IN

## (undated) DEVICE — HEMOSTAT, ARISTA, ABSORBABLE, 3 GRAMS